# Patient Record
Sex: FEMALE | Race: BLACK OR AFRICAN AMERICAN | Employment: FULL TIME | URBAN - METROPOLITAN AREA
[De-identification: names, ages, dates, MRNs, and addresses within clinical notes are randomized per-mention and may not be internally consistent; named-entity substitution may affect disease eponyms.]

---

## 2018-10-13 ENCOUNTER — ED HISTORICAL/CONVERTED ENCOUNTER (OUTPATIENT)
Dept: OTHER | Age: 18
End: 2018-10-13

## 2021-03-09 ENCOUNTER — OFFICE VISIT (OUTPATIENT)
Dept: OBGYN CLINIC | Age: 21
End: 2021-03-09
Payer: COMMERCIAL

## 2021-03-09 VITALS
OXYGEN SATURATION: 97 % | WEIGHT: 217 LBS | TEMPERATURE: 97.2 F | SYSTOLIC BLOOD PRESSURE: 131 MMHG | HEIGHT: 62 IN | DIASTOLIC BLOOD PRESSURE: 84 MMHG | HEART RATE: 87 BPM | BODY MASS INDEX: 39.93 KG/M2

## 2021-03-09 DIAGNOSIS — Z01.419 GYNECOLOGIC EXAM NORMAL: Primary | ICD-10-CM

## 2021-03-09 DIAGNOSIS — N92.6 IRREGULAR MENSES: ICD-10-CM

## 2021-03-09 DIAGNOSIS — N76.0 VAGINITIS AND VULVOVAGINITIS: ICD-10-CM

## 2021-03-09 DIAGNOSIS — Z12.4 ENCOUNTER FOR SCREENING FOR MALIGNANT NEOPLASM OF CERVIX: ICD-10-CM

## 2021-03-09 PROCEDURE — 99385 PREV VISIT NEW AGE 18-39: CPT | Performed by: OBSTETRICS & GYNECOLOGY

## 2021-03-09 RX ORDER — FLUCONAZOLE 150 MG/1
150 TABLET ORAL DAILY
Qty: 1 TAB | Refills: 0 | Status: SHIPPED | OUTPATIENT
Start: 2021-03-09 | End: 2021-03-10

## 2021-03-09 NOTE — PATIENT INSTRUCTIONS
Vaginal Yeast Infection: Care Instructions Your Care Instructions A vaginal yeast infection is caused by too many yeast cells in the vagina. This is common in women of all ages. Itching, vaginal discharge and irritation, and other symptoms can bother you. But yeast infections don't often cause other health problems. Some medicines can increase your risk of getting a yeast infection. These include antibiotics, birth control pills, hormones, and steroids. You may also be more likely to get a yeast infection if you are pregnant, have diabetes, douche, or wear tight clothes. With treatment, most yeast infections get better in 2 to 3 days. Follow-up care is a key part of your treatment and safety. Be sure to make and go to all appointments, and call your doctor if you are having problems. It's also a good idea to know your test results and keep a list of the medicines you take. How can you care for yourself at home? · Take your medicines exactly as prescribed. Call your doctor if you think you are having a problem with your medicine. · Ask your doctor about over-the-counter (OTC) medicines for yeast infections. They may cost less than prescription medicines. If you use an OTC treatment, read and follow all instructions on the label. · Do not use tampons while using a vaginal cream or suppository. The tampons can absorb the medicine. Use pads instead. · Wear loose cotton clothing. Do not wear nylon or other fabric that holds body heat and moisture close to the skin. · Try sleeping without underwear. · Do not scratch. Relieve itching with a cold pack or a cool bath. · Do not wash your vaginal area more than once a day. Use plain water or a mild, unscented soap. Air-dry the vaginal area. · Change out of wet swimsuits after swimming. · Do not have sex until you have finished your treatment. · Do not douche. When should you call for help? Call your doctor now or seek immediate medical care if:   · You have unexpected vaginal bleeding.  
  · You have new or increased pain in your vagina or pelvis. Watch closely for changes in your health, and be sure to contact your doctor if: 
  · You have a fever.  
  · You are not getting better after 2 days.  
  · Your symptoms come back after you finish your medicines. Where can you learn more? Go to http://www.gray.com/ Enter D946 in the search box to learn more about \"Vaginal Yeast Infection: Care Instructions. \" Current as of: November 8, 2019               Content Version: 12.6 © 2352-6877 [employer]. Care instructions adapted under license by [employer] (which disclaims liability or warranty for this information). If you have questions about a medical condition or this instruction, always ask your healthcare professional. [address] any warranty or liability for your use of this information. Breast Self-Exam: Care Instructions Your Care Instructions A breast self-exam is when you check your breasts for lumps or changes. This regular exam helps you learn how your breasts normally look and feel. Most breast problems or changes are not because of cancer. Breast self-exam is not a substitute for a mammogram. Having regular breast exams by your doctor and regular mammograms improve your chances of finding any problems with your breasts. Some women set a time each month to do a step-by-step breast self-exam. Other women like a less formal system. They might look at their breasts as they brush their teeth, or feel their breasts once in a while in the shower. If you notice a change in your breast, tell your doctor. Follow-up care is a key part of your treatment and safety. Be sure to make and go to all appointments, and call your doctor if you are having problems. It's also a good idea to know your test results and keep a list of the medicines you take. How do you do a breast self-exam? 
· The best time to examine your breasts is usually one week after your menstrual period begins. Your breasts should not be tender then. If you do not have periods, you might do your exam on a day of the month that is easy to remember. · To examine your breasts: ? Remove all your clothes above the waist and lie down. When you are lying down, your breast tissue spreads evenly over your chest wall, which makes it easier to feel all your breast tissue. ? Use the padsnot the fingertipsof the 3 middle fingers of your left hand to check your right breast. Move your fingers slowly in small coin-sized circles that overlap. ? Use three levels of pressure to feel of all your breast tissue. Use light pressure to feel the tissue close to the skin surface. Use medium pressure to feel a little deeper. Use firm pressure to feel your tissue close to your breastbone and ribs. Use each pressure level to feel your breast tissue before moving on to the next spot. ? Check your entire breast, moving up and down as if following a strip from the collarbone to the bra line, and from the armpit to the ribs. Repeat until you have covered the entire breast. 
? Repeat this procedure for your left breast, using the pads of the 3 middle fingers of your right hand. · To examine your breasts while in the shower: 
? Place one arm over your head and lightly soap your breast on that side. ? Using the pads of your fingers, gently move your hand over your breast (in the strip pattern described above), feeling carefully for any lumps or changes. ? Repeat for the other breast. 
· Have your doctor inspect anything you notice to see if you need further testing. Where can you learn more? Go to http://www.gray.com/ Enter P148 in the search box to learn more about \"Breast Self-Exam: Care Instructions. \" Current as of: April 29, 2020               Content Version: 12.6 © 4865-6701 [employer]. Care instructions adapted under license by [employer] (which disclaims liability or warranty for this information). If you have questions about a medical condition or this instruction, always ask your healthcare professional. [address] any warranty or liability for your use of this information.

## 2021-03-09 NOTE — PROGRESS NOTES
[place] Enriquez is a 23 y.o. female, No obstetric history on file., Patient's last menstrual period was 02/23/2021., who presents today for the following:  Chief Complaint   Patient presents with    Annual Exam        No Known Allergies    Current Outpatient Medications   Medication Sig    fluconazole (DIFLUCAN) 150 mg tablet Take 1 Tab by mouth daily for 1 day. FDA advises cautious prescribing of oral fluconazole in pregnancy. No current facility-administered medications for this visit. History reviewed. No pertinent past medical history. History reviewed. No pertinent surgical history. History reviewed. No pertinent family history. Social History     Socioeconomic History    Marital status: SINGLE     Spouse name: Not on file    Number of children: Not on file    Years of education: Not on file    Highest education level: Not on file   Occupational History    Not on file   Social Needs    Financial resource strain: Not on file    Food insecurity     Worry: Not on file     Inability: Not on file    Transportation needs     Medical: Not on file     Non-medical: Not on file   Tobacco Use    Smoking status: Never Smoker    Smokeless tobacco: Never Used   Substance and Sexual Activity    Alcohol use:  Yes    Drug use: Never    Sexual activity: Yes     Partners: Male     Birth control/protection: None   Lifestyle    Physical activity     Days per week: Not on file     Minutes per session: Not on file    Stress: Not on file   Relationships    Social connections     Talks on phone: Not on file     Gets together: Not on file     Attends [employer] service: Not on file     Active member of club or organization: Not on file     Attends meetings of clubs or organizations: Not on file     Relationship status: Not on file    Intimate partner violence     Fear of current or ex partner: Not on file     Emotionally abused: Not on file     Physically abused: Not on file     Forced sexual activity: Not on file   Other Topics Concern    Not on file   Social History Narrative    Not on file         HPI  Annual    Review of Systems   Constitutional: Negative. Respiratory: Negative. Cardiovascular: Negative. Gastrointestinal: Negative. Genitourinary: Negative. Musculoskeletal: Negative. Skin: Negative. Neurological: Negative. Endo/Heme/Allergies: Negative. Psychiatric/Behavioral: Negative. All other systems reviewed and are negative. /84 (BP 1 Location: Right arm, BP Patient Position: Sitting)   Pulse 87   Temp 97.2 °F (36.2 °C)   Ht 5' 2\" (1.575 m)   Wt 217 lb (98.4 kg)   LMP 02/23/2021   SpO2 97%   BMI 39.69 kg/m²    OBGyn Exam   Constitutional:     General Appearance: healthy-appearing, well-nourished, and well-developed; Level of Distress: NAD. Ambulation: ambulating normally. Psychiatric:   Insight: good judgement. Mental Status: normal mood and affect and active and alert. Orientation: to time, place, and person. Memory: recent memory normal and remote memory normal.     Head: Head: normocephalic and atraumatic. Neck:   Neck: supple, FROM, trachea midline, and no masses. Lungs:   Respiratory effort: no dyspnea. Cardiovascular:     Pulses including femoral / pedal: normal throughout. Breast: Breast: no masses or abnormal secretions and normal appearance. Abdomen:    no tenderness, guarding, masses, rebound tenderness, or CVA tenderness and non-distended. Female :   External genitalia: no lesions or rash and normal.   Vagina: moist mucosa; discharge. Cervix: no discharge, inflammation, or cervical motion tenderness   Uterus: midline, smooth, and non-tender; normal size   Adnexae: no adnexal mass or tenderness and size WNL. Bladder and Urethra: normal bladder and urethra (except where noted). Musculoskeletal[de-identified]   Motor Strength and Tone: normal tone and motor strength.    Joints, Bones, and Muscles: no contractures, malalignment, tenderness, or bony abnormalities and normal movement of all extremities. Extremities: no cyanosis, edema, varicosities, or palpable cord. Skin:   Inspection and palpation: no rash, lesions, ulcer, induration, nodules, jaundice, or abnormal nevi and good turgor. Nails: normal.     Back: Thoracolumbar Appearance: normal curvature. 1. Gynecologic exam normal    - PAP IG, CT-NG, RFX APTIMA HPV ASCUS ([id number], [id number])    2. Encounter for screening for malignant neoplasm of cervix      3. Vaginitis and vulvovaginitis    - fluconazole (DIFLUCAN) 150 mg tablet; Take 1 Tab by mouth daily for 1 day. FDA advises cautious prescribing of oral fluconazole in pregnancy. Dispense: 1 Tab; Refill: 0    4. Irregular menses    - TSH AND FREE T4  - PROLACTIN  - FSH AND LH        Follow-up and Dispositions    · Return in about 5 weeks (around 4/13/2021) for gyn.

## 2021-03-10 LAB
FSH SERPL-ACNC: 13.8 MIU/ML
LH SERPL-ACNC: 73.4 MIU/ML
PROLACTIN SERPL-MCNC: 29.1 NG/ML (ref 4.8–23.3)
T4 FREE SERPL-MCNC: 1.26 NG/DL (ref 0.82–1.77)
TSH SERPL DL<=0.005 MIU/L-ACNC: 1.71 UIU/ML (ref 0.45–4.5)

## 2021-03-11 LAB
C TRACH RRNA CVX QL NAA+PROBE: NEGATIVE
CYTOLOGIST CVX/VAG CYTO: NORMAL
CYTOLOGY CVX/VAG DOC CYTO: NORMAL
CYTOLOGY CVX/VAG DOC THIN PREP: NORMAL
DX ICD CODE: NORMAL
LABCORP, 190119: NORMAL
Lab: NORMAL
N GONORRHOEA RRNA CVX QL NAA+PROBE: NEGATIVE
OTHER STN SPEC: NORMAL
STAT OF ADQ CVX/VAG CYTO-IMP: NORMAL

## 2021-03-12 ENCOUNTER — TELEPHONE (OUTPATIENT)
Dept: OBGYN CLINIC | Age: 21
End: 2021-03-12

## 2021-03-12 DIAGNOSIS — R79.89 ELEVATED PROLACTIN LEVEL: Primary | ICD-10-CM

## 2021-03-12 NOTE — TELEPHONE ENCOUNTER
----- Message from Gallito Calhoun MD sent at 3/11/2021 10:26 AM EST -----  Please call the patient regarding her abnormal result.   Needs to see endocrine elevated prolactin

## 2021-03-12 NOTE — TELEPHONE ENCOUNTER
----- Message from Saran Chávez MD sent at 3/11/2021 10:26 AM EST -----  Please call the patient regarding her abnormal result.   Needs to see endocrine elevated prolactin

## 2021-03-12 NOTE — TELEPHONE ENCOUNTER
Patient called back and was advised she needs to be referred to an endocrinologist for her elevated prolactin level. Order entered.

## 2021-04-01 ENCOUNTER — OFFICE VISIT (OUTPATIENT)
Dept: ENDOCRINOLOGY | Age: 21
End: 2021-04-01
Payer: COMMERCIAL

## 2021-04-01 VITALS
HEIGHT: 62 IN | SYSTOLIC BLOOD PRESSURE: 131 MMHG | OXYGEN SATURATION: 99 % | WEIGHT: 215.7 LBS | DIASTOLIC BLOOD PRESSURE: 90 MMHG | BODY MASS INDEX: 39.69 KG/M2 | TEMPERATURE: 97.3 F | HEART RATE: 66 BPM

## 2021-04-01 DIAGNOSIS — E22.1 HYPERPROLACTINEMIA (HCC): Primary | ICD-10-CM

## 2021-04-01 PROBLEM — N92.6 IRREGULAR MENSES: Status: ACTIVE | Noted: 2021-04-01

## 2021-04-01 PROCEDURE — 99204 OFFICE O/P NEW MOD 45 MIN: CPT | Performed by: INTERNAL MEDICINE

## 2021-04-01 NOTE — PROGRESS NOTES
History and Physical    Patient: Evelio Ramirez MRN: [id number]  SSN: [id number]    YOB: 2000  Age: 23 y.o. Sex: female      Subjective:      Evelio Ramirez is a 23 y.o. female with no significant past medical history is sent to me by OB/GYN Dr. Juan Carcamo for hyperprolactinemia. She currently does not have a primary care physician. Patient went to OB/GYN because of irregular menstrual cycles for the past 3 months or so. Prior to that her cycles were more or less regular. Labs were done which came back showing elevated prolactin. So patient is sent here for further evaluation and management. onset: 3-9-2021  Nipple discharge, unilateral/bilateral: No  Up to date with mammogram: Not applicable  Family history of breast cancer: No  Headache: Now  Vision changes: No  Menstrual history: Irregular menstrual cycles  History/symptoms of hypothyroidism: Now  Medications (psychiatric medications, nausea medications, over-the-counter supplements or herbal preparations): No    History reviewed. No pertinent past medical history. History reviewed. No pertinent surgical history. Family History   Problem Relation Age of Onset    No Known Problems Mother     No Known Problems Father      Social History     Tobacco Use    Smoking status: Never Smoker    Smokeless tobacco: Never Used   Substance Use Topics    Alcohol use: Yes      Prior to Admission medications    Not on File        No Known Allergies    Review of Systems:  ROS    A comprehensive review of systems was preformed and it is negative except mentioned in HPI    Objective:     Vitals:    04/01/21 1107 04/01/21 1113   BP: (!) 138/93 (!) 131/90   Pulse: (!) 54 66   Temp: 97.3 °F (36.3 °C)    TempSrc: Temporal    SpO2: 99%    Weight: 215 lb 11.2 oz (97.8 kg)    Height: 5' 2\" (1.575 m)         Physical Exam:    Physical Exam  Vitals signs and nursing note reviewed.    Constitutional:       Appearance: Normal appearance. HENT:      Head: Normocephalic and atraumatic. Eyes:      Extraocular Movements: Extraocular movements intact. Pupils: Pupils are equal, round, and reactive to light. Cardiovascular:      Rate and Rhythm: Normal rate and regular rhythm. Pulmonary:      Effort: Pulmonary effort is normal.      Breath sounds: Normal breath sounds. Abdominal:      General: Bowel sounds are normal.      Palpations: Abdomen is soft. Musculoskeletal: Normal range of motion. General: No swelling. Skin:     General: Skin is warm. Neurological:      General: No focal deficit present. Mental Status: She is alert and oriented to person, place, and time. Psychiatric:         Mood and Affect: Mood normal.         Behavior: Behavior normal.          Labs and Imaging:  Results for Dimitry Stephens (MRN [id number]) as of 4/1/2021 10:56   Ref. Range 3/9/2021 10:15   FSH Latest Units: mIU/mL 13.8   Luteinizing hormone Latest Units: mIU/mL 73.4   T4, Free Latest Ref Range: 0.82 - 1.77 ng/dL 1.26   TSH Latest Ref Range: 0.450 - 4.500 uIU/mL 1.710   Prolactin Latest Ref Range: 4.8 - 23.3 ng/mL 29.1 (H)     Last 3 Recorded Weights in this Encounter    04/01/21 1107   Weight: 215 lb 11.2 oz (97.8 kg)        No results found for: HBA1C, HGBE8, MNK7JTDM, LPH3FBQS, NBC5ZZRE     Assessment:     Patient Active Problem List   Diagnosis Code    [facility] E[facility]    Irregular menses N92.6           Plan:     Hyperprolactinemia:  I reviewed labs and notes from the referring provider's office. 3-9-2021:  Prolactin elevated at 29.1 (4.823. 3)  Normal TSH  Patient does not have any symptoms of hyperprolactinemia except irregular menstrual cycles. She is not on any offending medications. No immediate plans of pregnancy. Plan:  Check prolactin level fasting, check CMP. If prolactin is still elevated I will order MRI pituitary.   I will see her back in my office in 3 weeks    Orders Placed This Encounter    PROLACTIN    METABOLIC PANEL, COMPREHENSIVE        Signed By: Yobany Dawson MD     April 1, 2021      Return to clinic 3 weeks

## 2021-04-01 NOTE — LETTER
4/1/2021    Patient: Brian Kerns   YOB: 2000   Date of Visit: 4/1/2021     Gilbert Casas MD  [address] [place] [address]  [facility] [employer]    Dear Gilbert Casas MD,      Thank you for referring Ms. Brian Kerns to [address] [place] [address] for evaluation. My notes for this consultation are attached. If you have questions, please do not hesitate to call me. I look forward to following your patient along with you.       Sincerely,    Sudarshan Guevara MD

## 2021-04-02 LAB
ALBUMIN SERPL-MCNC: 4 G/DL (ref 3.9–5)
ALBUMIN/GLOB SERPL: 1.3 {RATIO} (ref 1.2–2.2)
ALP SERPL-CCNC: 74 IU/L (ref 39–117)
ALT SERPL-CCNC: 9 IU/L (ref 0–32)
AST SERPL-CCNC: 16 IU/L (ref 0–40)
BILIRUB SERPL-MCNC: 0.2 MG/DL (ref 0–1.2)
BUN SERPL-MCNC: 8 MG/DL (ref 6–20)
BUN/CREAT SERPL: 12 (ref 9–23)
CALCIUM SERPL-MCNC: 9.4 MG/DL (ref 8.7–10.2)
CHLORIDE SERPL-SCNC: 104 MMOL/L (ref 96–106)
CO2 SERPL-SCNC: 25 MMOL/L (ref 20–29)
CREAT SERPL-MCNC: 0.68 MG/DL (ref 0.57–1)
GLOBULIN SER CALC-MCNC: 3 G/DL (ref 1.5–4.5)
GLUCOSE SERPL-MCNC: 74 MG/DL (ref 65–99)
POTASSIUM SERPL-SCNC: 4.7 MMOL/L (ref 3.5–5.2)
PROLACTIN SERPL-MCNC: 11.3 NG/ML (ref 4.8–23.3)
PROT SERPL-MCNC: 7 G/DL (ref 6–8.5)
SODIUM SERPL-SCNC: 138 MMOL/L (ref 134–144)

## 2021-04-05 ENCOUNTER — TELEPHONE (OUTPATIENT)
Dept: ENDOCRINOLOGY | Age: 21
End: 2021-04-05

## 2021-05-03 ENCOUNTER — OFFICE VISIT (OUTPATIENT)
Dept: ENDOCRINOLOGY | Age: 21
End: 2021-05-03
Payer: MEDICAID

## 2021-05-03 VITALS
DIASTOLIC BLOOD PRESSURE: 88 MMHG | HEART RATE: 70 BPM | BODY MASS INDEX: 39.93 KG/M2 | TEMPERATURE: 98.3 F | OXYGEN SATURATION: 99 % | WEIGHT: 217 LBS | HEIGHT: 62 IN | SYSTOLIC BLOOD PRESSURE: 127 MMHG

## 2021-05-03 DIAGNOSIS — E22.1 HYPERPROLACTINEMIA (HCC): Primary | ICD-10-CM

## 2021-05-03 PROCEDURE — 99214 OFFICE O/P EST MOD 30 MIN: CPT | Performed by: INTERNAL MEDICINE

## 2021-05-03 NOTE — PROGRESS NOTES
History and Physical    Patient: Abdirashid Cummings MRN: [id number]  SSN: [id number]    YOB: 2000  Age: 23 y.o. Sex: female      Subjective:      Abdirashid Cummings is a 23 y.o. female with no significant past medical history is here for follow-up of hyperprolactinemia. She was initially sent to me by OB/GYN Dr. Christian Ramirez. She currently does not have a primary care physician. After the last visit patient had labs done including prolactin and CMP was done fasting. She denies any change in her symptoms since the last visit. She is wondering if she can go on birth control pills because of irregular menstrual cycles. No plans of pregnancy. Initial history:  Patient went to OB/GYN because of irregular menstrual cycles for the past 3 months or so. Prior to that her cycles were more or less regular. Labs were done which came back showing elevated prolactin. So patient is sent here for further evaluation and management. onset: 3-9-2021  Nipple discharge, unilateral/bilateral: No  Up to date with mammogram: Not applicable  Family history of breast cancer: No  Headache: Now  Vision changes: No  Menstrual history: Irregular menstrual cycles  History/symptoms of hypothyroidism: Now  Medications (psychiatric medications, nausea medications, over-the-counter supplements or herbal preparations): No    History reviewed. No pertinent past medical history. History reviewed. No pertinent surgical history.    Family History   Problem Relation Age of Onset    No Known Problems Mother     No Known Problems Father      Social History     Tobacco Use    Smoking status: Never Smoker    Smokeless tobacco: Never Used   Substance Use Topics    Alcohol use: Yes      Prior to Admission medications    Not on File        No Known Allergies    Review of Systems:  ROS    A comprehensive review of systems was preformed and it is negative except mentioned in HPI    Objective:     Vitals: 05/03/21 1633   BP: 127/88   Pulse: 70   Temp: 98.3 °F (36.8 °C)   TempSrc: Temporal   SpO2: 99%   Weight: 217 lb (98.4 kg)   Height: 5' 2\" (1.575 m)        Physical Exam:    Physical Exam  Vitals signs and nursing note reviewed. Constitutional:       Appearance: Normal appearance. HENT:      Head: Normocephalic and atraumatic. Pulmonary:      Effort: Pulmonary effort is normal.   Neurological:      General: No focal deficit present. Mental Status: She is alert and oriented to person, place, and time. Psychiatric:         Mood and Affect: Mood normal.         Behavior: Behavior normal.          Labs and Imaging:  Results for Edward Blue (MRN [id number]) as of 5/3/2021 16:39   Ref. Range 4/1/2021 11:59   Sodium Latest Ref Range: 134 - 144 mmol/L 138   Potassium Latest Ref Range: 3.5 - 5.2 mmol/L 4.7   Chloride Latest Ref Range: 96 - 106 mmol/L 104   CO2 Latest Ref Range: 20 - 29 mmol/L 25   Glucose Latest Ref Range: 65 - 99 mg/dL 74   BUN Latest Ref Range: 6 - 20 mg/dL 8   Creatinine Latest Ref Range: 0.57 - 1.00 mg/dL 0.68   BUN/Creatinine ratio Latest Ref Range: 9 - 23  12   Calcium Latest Ref Range: 8.7 - 10.2 mg/dL 9.4   GFR est non-AA Latest Ref Range: >59 mL/min/1.73 125   GFR est AA Latest Ref Range: >59 mL/min/1.73 144   Bilirubin, total Latest Ref Range: 0.0 - 1.2 mg/dL 0.2   Protein, total Latest Ref Range: 6.0 - 8.5 g/dL 7.0   Albumin Latest Ref Range: 3.9 - 5.0 g/dL 4.0   A-G Ratio Latest Ref Range: 1.2 - 2.2  1.3   ALT Latest Ref Range: 0 - 32 IU/L 9   AST Latest Ref Range: 0 - 40 IU/L 16   Alk. phosphatase Latest Ref Range: 39 - 117 IU/L 74     Results for Edward Blue (MRN [id number]) as of 5/3/2021 16:39   Ref.  Range 3/9/2021 10:15 4/1/2021 11:59   FSH Latest Units: mIU/mL 13.8    Luteinizing hormone Latest Units: mIU/mL 73.4    T4, Free Latest Ref Range: 0.82 - 1.77 ng/dL 1.26    TSH Latest Ref Range: 0.450 - 4.500 uIU/mL 1.710    Prolactin Latest Ref Range: 4.8 - 23.3 ng/mL 29.1 (H) 11.3       Last 3 Recorded Weights in this Encounter    05/03/21 1633   Weight: 217 lb (98.4 kg)        No results found for: HBA1C, HGBE8, WYP2QOOR, QIK5PEEC, KYL7IYNH     Assessment:     Patient Active Problem List   Diagnosis Code    [facility] E22.1    Irregular menses N92.6           Plan:     Hyperprolactinemia:  3-9-2021:  Prolactin elevated at 29.1 (4.823. 3)  Normal TSH  Patient does not have any symptoms of hyperprolactinemia except irregular menstrual cycles. She is not on any offending medications. No immediate plans of pregnancy. 4-1-2021:  Normal CMP  Prolactin normal at 11.1  Plan:  It does not appear that patient has hyperprolactinemia. No further follow-up or testing necessary. Irregular menstrual cycles:  Likely PCOS. Okay to go on birth control pills to regularize menstrual cycles. No orders of the defined types were placed in this encounter.        Signed By: Sukumar Florez MD     May 3, 2021      Return to clinic as needed

## 2021-07-22 ENCOUNTER — OFFICE VISIT (OUTPATIENT)
Dept: OBGYN CLINIC | Age: 21
End: 2021-07-22
Payer: COMMERCIAL

## 2021-07-22 DIAGNOSIS — N91.2 AMENORRHEA: Primary | ICD-10-CM

## 2021-07-22 DIAGNOSIS — N94.89 SUPPRESSION OF MENSES: ICD-10-CM

## 2021-07-22 LAB
HCG URINE, QL. (POC): NEGATIVE
VALID INTERNAL CONTROL?: YES

## 2021-07-22 PROCEDURE — 99213 OFFICE O/P EST LOW 20 MIN: CPT | Performed by: OBSTETRICS & GYNECOLOGY

## 2021-07-22 PROCEDURE — 81025 URINE PREGNANCY TEST: CPT | Performed by: OBSTETRICS & GYNECOLOGY

## 2021-07-22 PROCEDURE — 11981 INSERTION DRUG DLVR IMPLANT: CPT | Performed by: OBSTETRICS & GYNECOLOGY

## 2021-07-22 NOTE — PATIENT INSTRUCTIONS
Implant for Birth Control: Care Instructions  Your Care Instructions     The implant is used to prevent pregnancy. It's a thin tony about the size of a matchstick that is inserted under the skin (subdermal) on the inside of your arm. The implant prevents pregnancy for 3 years. After it is put in, you don't have to do anything else to prevent pregnancy. Follow-up care is a key part of your treatment and safety. Be sure to make and go to all appointments, and call your doctor if you are having problems. It's also a good idea to know your test results and keep a list of the medicines you take. How can you care for yourself at home? How do you use the subdermal implant? · The implant is put in by your doctor or another trained health professional. It only takes a few minutes. This can also be done right after you give birth. Your doctor will remove the implant when it needs to be taken out. · An adhesive bandage and a tight (pressure) bandage will be placed over the site. This helps reduce swelling and bruising. · Ask your doctor if you need to use backup birth control, such as a condom, for a week after insertion. Whether you need to do this depends on where you are in your cycle. How can you care for the insertion site? · Remove the pressure bandage after 24 hours. Keep the area dry. · Keep an adhesive bandage on the site for 3 to 5 days after the procedure. · If you have pain, use an ice pack or take an over-the-counter pain medicine. Some soreness or bruising is normal.  What else do you need to know? · It's safe to use while breastfeeding. · The implant has side effects. ? You may have changes in your period. Your period may stop. You may also have spotting or bleeding between periods. ? You may have mood changes, less interest in sex, or weight gain. · The implant can stay in place for up to 3 years to prevent pregnancy. But your doctor may talk to you about leaving it in for longer.   ? If you don't replace the implant and don't use another form of birth control, you could get pregnant. ? If you have the implant removed, you'll have to find another method of birth control. If you don't, you may get pregnant. ? Even if you are planning to get pregnant, you have to have the implant removed. · Check with your doctor before you use any other medicines. This includes over-the-counter medicines, vitamins, herbal products, and supplements. Birth control hormones may not work as well to prevent pregnancy when combined with other medicines. · The implant doesn't protect against sexually transmitted infections (STIs), such as herpes or HIV/AIDS. If you're not sure if your sex partner might have an STI, use a condom to protect against infection. When should you call for help? Call 911 anytime you think you may need emergency care. For example, call if:    · You have shortness of breath.     · You have chest pain.     · You passed out (lost consciousness).     · You cough up blood. Call your doctor now or seek immediate medical care if:    · You have severe pain or numbness and tingling in the arm where the implant was inserted.     · You have increased pain, swelling, warmth, or redness at the insertion site.     · You have signs of a blood clot in your leg (called a deep vein thrombosis), such as:  ? Pain in your calf, back of the knee, thigh, or groin. ? Redness and swelling in your leg. Watch closely for changes in your health, and be sure to contact your doctor if:    · You can't feel your implant.     · You think your implant might be bent or broken in your arm.     · You think you might be pregnant.     · You have any problems with your birth control method. Where can you learn more? Go to http://www.gray.com/  Enter V768 in the search box to learn more about \"Implant for Birth Control: Care Instructions. \"  Current as of: October 8, 2020               Content Version: 12.8  © 0194-2053 [employer]. Care instructions adapted under license by [employer] (which disclaims liability or warranty for this information). If you have questions about a medical condition or this instruction, always ask your healthcare professional. [address] any warranty or liability for your use of this information.

## 2021-07-22 NOTE — PROGRESS NOTES
Zeyad Brand is a 19 y.o. female, No obstetric history on file., No LMP recorded. , who presents today for the following:  No chief complaint on file. No Known Allergies    No current outpatient medications on file. No current facility-administered medications for this visit. History reviewed. No pertinent past medical history. History reviewed. No pertinent surgical history. Family History   Problem Relation Age of Onset    No Known Problems Mother     No Known Problems Father        Social History     Socioeconomic History    Marital status: SINGLE     Spouse name: Not on file    Number of children: Not on file    Years of education: Not on file    Highest education level: Not on file   Occupational History    Not on file   Tobacco Use    Smoking status: Never Smoker    Smokeless tobacco: Never Used   Vaping Use    Vaping Use: Never used   Substance and Sexual Activity    Alcohol use: Yes    Drug use: Never    Sexual activity: Yes     Partners: Male     Birth control/protection: None   Other Topics Concern    Not on file   Social History Narrative    Not on file     Social Determinants of Health     Financial Resource Strain:     Difficulty of Paying Living Expenses:    Food Insecurity:     Worried About Running Out of Food in the Last Year:     [address] in the Last Year:    Transportation Needs:     Lack of Transportation (Medical):      Lack of Transportation (Non-Medical):    Physical Activity:     Days of Exercise per Week:     Minutes of Exercise per Session:    Stress:     Feeling of Stress :    Social Connections:     Frequency of Communication with Friends and Family:     Frequency of Social Gatherings with Friends and Family:     Attends Holiness Services:     Active Member of Clubs or Organizations:     Attends Club or Organization Meetings:     Marital Status:    Intimate Partner Violence:     Fear of Current or Ex-Partner:     Emotionally Abused:     Physically Abused:     Sexually Abused:          HPI  Insert of nexplanon    Review of Systems   Constitutional: Negative. Respiratory: Negative. Cardiovascular: Negative. Gastrointestinal: Negative. Genitourinary: Negative. Musculoskeletal: Negative. Skin: Negative. Neurological: Negative. Endo/Heme/Allergies: Negative. Psychiatric/Behavioral: Negative. All other systems reviewed and are negative. There were no vitals taken for this visit. OBGyn Exam     PE:  Constitutional: General Appearance: healthy-appearing, well-nourished, well-developed, and well groomed. Psychiatric: Orientation: to time, place, and person. Mood and Affect: normal mood and affect and appropriate and active and alert. Patient desires Nexplanon insertion. Knows contraception effect begins almost immediately   Knows fertility will resume very soon after removal in 3 years, or when desired. Nexplanon insertion site on inner, upper left arm palpated, and skin area prepped with Povodine Iodine. \"Insertion point\" and tract of insertion infiltrated with 1% Lidocaine using 31G needle. #11 Scalpel blade used to incise the skin   Implant identified inside insertion needle. Insertion \"trocar\" inserted per protocol, and Nexplanon deployed, using \"withdrawal\" technique>   Device not identified inside trocar after insertion   Patient was able to palpate implant after insertion. Butterfly\" tape used to close incision   Dressing applied, and patient advised to keep dressing in place for 24 hours, and keep tape on incision for 48 hours. <Patient tolerated procedure without incident      1. Amenorrhea    - AMB POC URINE PREGNANCY TEST, VISUAL COLOR COMPARISON    2. Suppression of menses    - etonogestreL impl 68 mg  - INSERTION DRUG IMPLANT DEVICE        Follow-up and Dispositions    · Return if symptoms worsen or fail to improve.

## 2021-08-09 ENCOUNTER — OFFICE VISIT (OUTPATIENT)
Dept: OBGYN CLINIC | Age: 21
End: 2021-08-09
Payer: COMMERCIAL

## 2021-08-09 VITALS
SYSTOLIC BLOOD PRESSURE: 128 MMHG | OXYGEN SATURATION: 96 % | WEIGHT: 225 LBS | HEIGHT: 62 IN | DIASTOLIC BLOOD PRESSURE: 80 MMHG | BODY MASS INDEX: 41.41 KG/M2 | HEART RATE: 83 BPM

## 2021-08-09 DIAGNOSIS — Z30.46 ENCOUNTER FOR SURVEILLANCE OF NEXPLANON SUBDERMAL CONTRACEPTIVE: Primary | ICD-10-CM

## 2021-08-09 PROCEDURE — 99213 OFFICE O/P EST LOW 20 MIN: CPT | Performed by: OBSTETRICS & GYNECOLOGY

## 2021-08-09 RX ORDER — ETONOGESTREL 68 MG/1
68 IMPLANT SUBCUTANEOUS
COMMUNITY

## 2021-08-18 NOTE — PROGRESS NOTES
Karol Zamorano is a 19 y.o. female, , Patient's last menstrual period was 2021., who presents today for the following:  Chief Complaint   Patient presents with   Luis M Federico     Pt states she feels it has moved and is bent. No Known Allergies    Current Outpatient Medications   Medication Sig    etonogestreL (Nexplanon) 68 mg impl 68 mg by SubDERmal route. No current facility-administered medications for this visit. No past medical history on file. No past surgical history on file. Family History   Problem Relation Age of Onset    No Known Problems Mother     No Known Problems Father        Social History     Socioeconomic History    Marital status: SINGLE     Spouse name: Not on file    Number of children: Not on file    Years of education: Not on file    Highest education level: Not on file   Occupational History    Not on file   Tobacco Use    Smoking status: Never Smoker    Smokeless tobacco: Never Used   Vaping Use    Vaping Use: Never used   Substance and Sexual Activity    Alcohol use: Yes    Drug use: Never    Sexual activity: Yes     Partners: Male     Birth control/protection: None   Other Topics Concern    Not on file   Social History Narrative    Not on file     Social Determinants of Health     Financial Resource Strain:     Difficulty of Paying Living Expenses:    Food Insecurity:     Worried About Running Out of Food in the Last Year:     [address] in the Last Year:    Transportation Needs:     Lack of Transportation (Medical):      Lack of Transportation (Non-Medical):    Physical Activity:     Days of Exercise per Week:     Minutes of Exercise per Session:    Stress:     Feeling of Stress :    Social Connections:     Frequency of Communication with Friends and Family:     Frequency of Social Gatherings with Friends and Family:     Attends Hoahaoism Services:     Active Member of Clubs or Organizations:     Attends Club or Organization Meetings:     Marital Status:    Intimate Partner Violence:     Fear of Current or Ex-Partner:     Emotionally Abused:     Physically Abused:     Sexually Abused:          HPI  Patient presents for evaluation of nexplanon  Patient reports implant \"feels bent\"  Change felt on self palpation  Denies pain , no weakness in ext  No arm or hand tingling  No changes in menstrual cyles    Review of Systems   Constitutional: Negative. Respiratory: Negative. Cardiovascular: Negative. Gastrointestinal: Negative. Genitourinary: Negative. Musculoskeletal: Negative. Skin: Negative. Neurological: Negative. Endo/Heme/Allergies: Negative. Psychiatric/Behavioral: Negative. All other systems reviewed and are negative. /80 (BP 1 Location: Right arm, BP Patient Position: Sitting)   Pulse 83   Ht 5' 2\" (1.575 m)   Wt 225 lb (102.1 kg)   LMP 07/30/2021   SpO2 96%   BMI 41.15 kg/m²    OBGyn Exam   Constitutional:      General Appearance: healthy-appearing, well-nourished, and well-developed; . Level of Distress: NAD. Ambulation: ambulating normally. Psychiatric:   Insight: good judgement. Mental Status: normal mood and affect and active and alert. Orientation: to time, place, and person. Memory: recent memory normal and remote memory normal.     Head: Head: normocephalic and atraumatic. Neck:   Neck: supple, FROM, trachea midline, and no masses. Lymph Nodes: no cervical LAD, supraclavicular LAD, axillary LAD, or inguinal LAD. Thyroid: no enlargement or nodules and non-tender. Lungs:   Respiratory effort: no dyspnea. Cardiovascular:    Pulses : normal throughout. Musculoskeletal[de-identified]   Motor Strength and Tone: normal tone and motor strength. Joints, Bones, and Muscles: no contractures, malalignment, tenderness, or bony abnormalities and normal movement of all extremities.    Extremities: no cyanosis, edema, varicosities, or palpable cord.  Device palpated and indentation palpated. Neurologic:     Sensation: grossly intact. Reflexes: DTRs 2+ bilaterally throughout. Skin:   Inspection and palpation: no rash, lesions, ulcer, induration, nodules, jaundice, or abnormal nevi and good turgor.  Nails: normal.            1. Encounter for surveillance of Nexplanon subdermal contraceptive

## 2022-03-19 PROBLEM — E22.1 HYPERPROLACTINEMIA (HCC): Status: ACTIVE | Noted: 2021-04-01

## 2022-03-20 PROBLEM — N92.6 IRREGULAR MENSES: Status: ACTIVE | Noted: 2021-04-01

## 2022-07-05 NOTE — TELEPHONE ENCOUNTER
Patient is wanting to retake labs several days before her next appointment because she is changing her diet. She feels this will give more accurate results, due to her diet change.
noted
Yes

## 2023-05-31 ENCOUNTER — TELEPHONE (OUTPATIENT)
Age: 23
End: 2023-05-31

## 2023-10-18 ENCOUNTER — TELEPHONE (OUTPATIENT)
Age: 23
End: 2023-10-18

## 2023-10-18 NOTE — TELEPHONE ENCOUNTER
10/18/23 10:58AM Rec'd a call from the patient requesting an appt for the removal of her nexplanon, however [place] and needs everything in one appointment if possible---patient will have to fly here from [place] states she has another OBGYN but they are not able to get her an appt until Jan and they suggested to her it's best to get it removed by Dr. Terrance Ca can be reached at [phone].

## 2023-10-18 NOTE — TELEPHONE ENCOUNTER
Returned the patient's call and she states she has been experiencing a lot of bleeding and is also ready to start trying to conceive. Advised her Dr Kaila Damico does not have any openings until 12/20/23. She states she is currently in a meeting at work and will have to call back.